# Patient Record
Sex: FEMALE | Race: WHITE | NOT HISPANIC OR LATINO | Employment: OTHER | URBAN - METROPOLITAN AREA
[De-identification: names, ages, dates, MRNs, and addresses within clinical notes are randomized per-mention and may not be internally consistent; named-entity substitution may affect disease eponyms.]

---

## 2019-06-03 ENCOUNTER — HOSPITAL ENCOUNTER (EMERGENCY)
Facility: CLINIC | Age: 44
Discharge: HOME | End: 2019-06-03
Attending: FAMILY MEDICINE | Admitting: FAMILY MEDICINE
Payer: COMMERCIAL

## 2019-06-03 VITALS
DIASTOLIC BLOOD PRESSURE: 78 MMHG | OXYGEN SATURATION: 100 % | WEIGHT: 190 LBS | RESPIRATION RATE: 20 BRPM | TEMPERATURE: 98.5 F | SYSTOLIC BLOOD PRESSURE: 131 MMHG

## 2019-06-03 DIAGNOSIS — R19.7 VOMITING AND DIARRHEA: ICD-10-CM

## 2019-06-03 DIAGNOSIS — R11.10 VOMITING AND DIARRHEA: ICD-10-CM

## 2019-06-03 DIAGNOSIS — E87.6 HYPOKALEMIA: ICD-10-CM

## 2019-06-03 LAB
ALBUMIN SERPL-MCNC: 3.7 G/DL (ref 3.4–5)
ALP SERPL-CCNC: 57 U/L (ref 40–150)
ALT SERPL W P-5'-P-CCNC: 19 U/L (ref 0–50)
ANION GAP SERPL CALCULATED.3IONS-SCNC: 6 MMOL/L (ref 3–14)
AST SERPL W P-5'-P-CCNC: 15 U/L (ref 0–45)
B-HCG SERPL-ACNC: <1 IU/L (ref 0–5)
BASOPHILS # BLD AUTO: 0 10E9/L (ref 0–0.2)
BASOPHILS NFR BLD AUTO: 0.3 %
BILIRUB SERPL-MCNC: 0.5 MG/DL (ref 0.2–1.3)
BUN SERPL-MCNC: 11 MG/DL (ref 7–30)
CALCIUM SERPL-MCNC: 8.2 MG/DL (ref 8.5–10.1)
CHLORIDE SERPL-SCNC: 107 MMOL/L (ref 94–109)
CO2 SERPL-SCNC: 27 MMOL/L (ref 20–32)
CREAT SERPL-MCNC: 0.62 MG/DL (ref 0.52–1.04)
DIFFERENTIAL METHOD BLD: ABNORMAL
EOSINOPHIL NFR BLD AUTO: 0.5 %
ERYTHROCYTE [DISTWIDTH] IN BLOOD BY AUTOMATED COUNT: 12.4 % (ref 10–15)
GFR SERPL CREATININE-BSD FRML MDRD: >90 ML/MIN/{1.73_M2}
GLUCOSE SERPL-MCNC: 93 MG/DL (ref 70–99)
HCT VFR BLD AUTO: 43.7 % (ref 35–47)
HGB BLD-MCNC: 14.4 G/DL (ref 11.7–15.7)
IMM GRANULOCYTES # BLD: 0.1 10E9/L (ref 0–0.4)
IMM GRANULOCYTES NFR BLD: 0.5 %
LIPASE SERPL-CCNC: 161 U/L (ref 73–393)
LYMPHOCYTES # BLD AUTO: 0.6 10E9/L (ref 0.8–5.3)
LYMPHOCYTES NFR BLD AUTO: 5.4 %
MCH RBC QN AUTO: 31.6 PG (ref 26.5–33)
MCHC RBC AUTO-ENTMCNC: 33 G/DL (ref 31.5–36.5)
MCV RBC AUTO: 96 FL (ref 78–100)
MONOCYTES # BLD AUTO: 0.6 10E9/L (ref 0–1.3)
MONOCYTES NFR BLD AUTO: 5.8 %
NEUTROPHILS # BLD AUTO: 9.4 10E9/L (ref 1.6–8.3)
NEUTROPHILS NFR BLD AUTO: 87.5 %
NRBC # BLD AUTO: 0 10*3/UL
NRBC BLD AUTO-RTO: 0 /100
PLATELET # BLD AUTO: 211 10E9/L (ref 150–450)
POTASSIUM SERPL-SCNC: 3 MMOL/L (ref 3.4–5.3)
PROT SERPL-MCNC: 7.2 G/DL (ref 6.8–8.8)
RBC # BLD AUTO: 4.56 10E12/L (ref 3.8–5.2)
SODIUM SERPL-SCNC: 140 MMOL/L (ref 133–144)
WBC # BLD AUTO: 10.8 10E9/L (ref 4–11)

## 2019-06-03 PROCEDURE — 84702 CHORIONIC GONADOTROPIN TEST: CPT | Performed by: FAMILY MEDICINE

## 2019-06-03 PROCEDURE — 85025 COMPLETE CBC W/AUTO DIFF WBC: CPT | Performed by: FAMILY MEDICINE

## 2019-06-03 PROCEDURE — 83690 ASSAY OF LIPASE: CPT | Performed by: FAMILY MEDICINE

## 2019-06-03 PROCEDURE — 96374 THER/PROPH/DIAG INJ IV PUSH: CPT | Performed by: FAMILY MEDICINE

## 2019-06-03 PROCEDURE — 80053 COMPREHEN METABOLIC PANEL: CPT | Performed by: FAMILY MEDICINE

## 2019-06-03 PROCEDURE — 96361 HYDRATE IV INFUSION ADD-ON: CPT | Performed by: FAMILY MEDICINE

## 2019-06-03 PROCEDURE — 25000128 H RX IP 250 OP 636: Performed by: FAMILY MEDICINE

## 2019-06-03 PROCEDURE — 99284 EMERGENCY DEPT VISIT MOD MDM: CPT | Mod: 25 | Performed by: FAMILY MEDICINE

## 2019-06-03 PROCEDURE — 99284 EMERGENCY DEPT VISIT MOD MDM: CPT | Mod: Z6 | Performed by: FAMILY MEDICINE

## 2019-06-03 RX ORDER — POTASSIUM CHLORIDE 1.5 G/1.58G
40 POWDER, FOR SOLUTION ORAL ONCE
Status: DISCONTINUED | OUTPATIENT
Start: 2019-06-03 | End: 2019-06-03

## 2019-06-03 RX ORDER — ONDANSETRON 4 MG/1
4 TABLET, ORALLY DISINTEGRATING ORAL EVERY 6 HOURS PRN
Qty: 12 TABLET | Refills: 0 | Status: SHIPPED | OUTPATIENT
Start: 2019-06-03 | End: 2019-06-08

## 2019-06-03 RX ORDER — SODIUM CHLORIDE 9 MG/ML
1000 INJECTION, SOLUTION INTRAVENOUS CONTINUOUS
Status: DISCONTINUED | OUTPATIENT
Start: 2019-06-03 | End: 2019-06-03 | Stop reason: HOSPADM

## 2019-06-03 RX ORDER — ONDANSETRON 2 MG/ML
4 INJECTION INTRAMUSCULAR; INTRAVENOUS EVERY 30 MIN PRN
Status: DISCONTINUED | OUTPATIENT
Start: 2019-06-03 | End: 2019-06-03 | Stop reason: HOSPADM

## 2019-06-03 RX ADMIN — ONDANSETRON 4 MG: 2 INJECTION INTRAMUSCULAR; INTRAVENOUS at 04:45

## 2019-06-03 RX ADMIN — SODIUM CHLORIDE 1000 ML: 9 INJECTION, SOLUTION INTRAVENOUS at 04:40

## 2019-06-03 NOTE — DISCHARGE INSTRUCTIONS
Encourage fluids and advance diet as tolerated.  Your potassium was a little bit low due to the vomiting and diarrhea.  You can refer to the hypokalemia information sheet for foods rich in potassium.  Zofran ODT as needed for nausea.  Recheck in clinic if persistent problems.  Return to the ED if you develop persistent vomiting, bloody diarrhea, increasing pain or any concerns.  It was a pleasure visiting with both of you this morning.  I am glad you are feeling at least a little bit better and hope you continue to improve.    Thank you for choosing [facility]. We appreciate the opportunity to meet your urgent medical needs. Please let us know if we could have done anything to make your stay more satisfying.    After discharge, please closely monitor for any new or worsening symptoms. Return to the Emergency Department if you develop any acute worsening signs or symptoms.    If you had lab work, cultures or imaging studies done during your stay, the final results may still be pending. We will call you if your plan of care needs to change. However, if you are not improving as expected, please follow up with your primary care provider or clinic.     Start any prescription medications that were prescribed to you and take them as directed.     Please see additional handouts that may be pertinent to your condition.

## 2019-06-03 NOTE — ED TRIAGE NOTES
Pt reports nausea, vomiting and diarrhea on Thursday and was seen in the ED in [place], was given antinausea medication and was feeling better so she checked herself out. Reports no symptoms over the weekend and then about 2am symptoms returned.

## 2019-06-03 NOTE — ED AVS SNAPSHOT
[facility] Emergency Department  [address] [place] [address]  Phone:  [phone]  Fax:  [phone]                                    Delmy Oneal   MRN: [id number]    Department:  [facility] Emergency Department   Date of Visit:  6/3/2019           After Visit Summary Signature Page    I have received my discharge instructions, and my questions have been answered. I have discussed any challenges I see with this plan with the nurse or doctor.    ..........................................................................................................................................  Patient/Patient Representative Signature      ..........................................................................................................................................  Patient Representative Print Name and Relationship to Patient    ..................................................               ................................................  Date                                   Time    ..........................................................................................................................................  Reviewed by Signature/Title    ...................................................              ..............................................  Date                                               Time          22EPIC Rev 08/18

## 2019-06-03 NOTE — ED PROVIDER NOTES
"  History     Chief Complaint   Patient presents with     Nausea, Vomiting, & Diarrhea     HPI  Delmy Oneal is a 44 year old female who presents to the ED tonight with vomiting and diarrhea.  She had some problems 4 days ago when she developed nausea vomiting diarrhea about 2 hours after eating some chicken wings.  She works at a .  She had vomiting and diarrhea all night and finally went into the emergency department at [facility] around midnight or so.  She was given some Zofran in triage and they were very busy and shortly thereafter she felt much better and left without being seen by a doctor.  That was on Thursday night into Friday morning.  Friday she did \"okay\" and by the evening was feeling good and back to eating normally.  Saturday she also did well and had some pizza.  Sunday she was fine until tonight.  She had pizza earlier this evening and awoke at 2:00 this morning on Monday with nausea vomiting and diarrhea x2 each once again.  She denies any blood in the stools.  No fevers but did feel hot and cold.  Has some aching discomfort in the epigastric region left upper quadrant.  No previous abdominal surgeries.    Otherwise in good health.  Takes no regular medications.  4 vaginal deliveries.  Here with her , Oseas.    Allergies:  No Known Allergies    Problem List:    There are no active problems to display for this patient.       Past Medical History:    History reviewed. No pertinent past medical history.    Past Surgical History:    History reviewed. No pertinent surgical history.    Family History:    History reviewed. No pertinent family history.    Social History:  Marital Status:   [2]  Social History     Tobacco Use     Smoking status: Never Smoker     Smokeless tobacco: Never Used   Substance Use Topics     Alcohol use: Yes     Comment: occasional      Drug use: Never        Medications:      ondansetron (ZOFRAN ODT) 4 MG ODT tab         Review of Systems   All " other systems reviewed and are negative.      Physical Exam   BP: 132/71  Heart Rate: 89  Temp: 98.5  F (36.9  C)  Resp: 20  Weight: 86.2 kg (190 lb)  SpO2: 99 %      Physical Exam   Constitutional: She is oriented to person, place, and time. She appears well-developed and well-nourished. No distress.   HENT:   Mouth/Throat: Oropharynx is clear and moist.   Eyes: EOM are normal.   Cardiovascular: Normal rate and regular rhythm.   Pulmonary/Chest: Effort normal and breath sounds normal. No respiratory distress.   Abdominal: Soft. There is tenderness (mild LUQ). There is no rebound and no guarding.   Musculoskeletal: Normal range of motion.   Neurological: She is alert and oriented to person, place, and time. No cranial nerve deficit.   Skin: Skin is warm and dry.   Psychiatric: She has a normal mood and affect.       ED Course  (with Medical Decision Making)    44-year-old female with second episode of vomiting and diarrhea after suffering the same about 4 days ago.  No blood in the stools.  Felt better after some Zofran in triage and then was doing fine until waking up at 2:00 again this morning.    IV was placed.  Labs drawn.  1 L normal saline while waiting for labs to come back.  Zofran for nausea.      Her labs were reassuring.  Potassium a little bit low at 3.0.  I was going to give her 40 mEq of K-Maia but she would really like to get going as she has  kids coming soon.  She runs a  at home.  She was given a list of high potassium foods that she can refer to.  She was able to tolerate oral liquids.  Zofran ODT for home.  We discussed reportable signs and when to return.  She and her  are comfortable with this plan.    She has persistent problems especially after eating fatty foods, may need to look at her gallbladder.  This possibility was raised earlier this year when she was seen a couple of different times with abdominal pain.  Typically would not cause diarrhea but still need to  consider it.          Procedures               Critical Care time:  none               Results for orders placed or performed during the hospital encounter of 06/03/19 (from the past 24 hour(s))   CBC with platelets differential   Result Value Ref Range    WBC 10.8 4.0 - 11.0 10e9/L    RBC Count 4.56 3.8 - 5.2 10e12/L    Hemoglobin 14.4 11.7 - 15.7 g/dL    Hematocrit 43.7 35.0 - 47.0 %    MCV 96 78 - 100 fl    MCH 31.6 26.5 - 33.0 pg    MCHC 33.0 31.5 - 36.5 g/dL    RDW 12.4 10.0 - 15.0 %    Platelet Count 211 150 - 450 10e9/L    Diff Method Automated Method     % Neutrophils 87.5 %    % Lymphocytes 5.4 %    % Monocytes 5.8 %    % Eosinophils 0.5 %    % Basophils 0.3 %    % Immature Granulocytes 0.5 %    Nucleated RBCs 0 0 /100    Absolute Neutrophil 9.4 (H) 1.6 - 8.3 10e9/L    Absolute Lymphocytes 0.6 (L) 0.8 - 5.3 10e9/L    Absolute Monocytes 0.6 0.0 - 1.3 10e9/L    Absolute Basophils 0.0 0.0 - 0.2 10e9/L    Abs Immature Granulocytes 0.1 0 - 0.4 10e9/L    Absolute Nucleated RBC 0.0    Comprehensive metabolic panel   Result Value Ref Range    Sodium 140 133 - 144 mmol/L    Potassium 3.0 (L) 3.4 - 5.3 mmol/L    Chloride 107 94 - 109 mmol/L    Carbon Dioxide 27 20 - 32 mmol/L    Anion Gap 6 3 - 14 mmol/L    Glucose 93 70 - 99 mg/dL    Urea Nitrogen 11 7 - 30 mg/dL    Creatinine 0.62 0.52 - 1.04 mg/dL    GFR Estimate >90 >60 mL/min/[1.73_m2]    GFR Estimate If Black >90 >60 mL/min/[1.73_m2]    Calcium 8.2 (L) 8.5 - 10.1 mg/dL    Bilirubin Total 0.5 0.2 - 1.3 mg/dL    Albumin 3.7 3.4 - 5.0 g/dL    Protein Total 7.2 6.8 - 8.8 g/dL    Alkaline Phosphatase 57 40 - 150 U/L    ALT 19 0 - 50 U/L    AST 15 0 - 45 U/L   Lipase   Result Value Ref Range    Lipase 161 73 - 393 U/L   HCG quantitative pregnancy   Result Value Ref Range    HCG Quantitative Serum <1 0 - 5 IU/L       Medications   0.9% sodium chloride BOLUS (0 mLs Intravenous Stopped 6/3/19 0526)     Followed by   sodium chloride 0.9% infusion (has no administration  in time range)   ondansetron (ZOFRAN) injection 4 mg (4 mg Intravenous Given 6/3/19 2801)       Assessments & Plan     I have reviewed the nursing notes.    I have reviewed the findings, diagnosis, plan and need for follow up with the patient.          Medication List      Started    ondansetron 4 MG ODT tab  Commonly known as:  ZOFRAN ODT  4 mg, Oral, EVERY 6 HOURS PRN            Final diagnoses:   Vomiting and diarrhea   Hypokalemia - mild, 3.0       6/3/2019   [facility] EMERGENCY DEPARTMENT     Harshil Braun MD  06/03/19 3188

## 2024-04-22 ENCOUNTER — HOSPITAL ENCOUNTER (EMERGENCY)
Facility: CLINIC | Age: 49
Discharge: HOME | End: 2024-04-22
Attending: STUDENT IN AN ORGANIZED HEALTH CARE EDUCATION/TRAINING PROGRAM | Admitting: STUDENT IN AN ORGANIZED HEALTH CARE EDUCATION/TRAINING PROGRAM
Payer: COMMERCIAL

## 2024-04-22 ENCOUNTER — APPOINTMENT (OUTPATIENT)
Dept: GENERAL RADIOLOGY | Facility: CLINIC | Age: 49
End: 2024-04-22
Attending: STUDENT IN AN ORGANIZED HEALTH CARE EDUCATION/TRAINING PROGRAM
Payer: COMMERCIAL

## 2024-04-22 VITALS
SYSTOLIC BLOOD PRESSURE: 113 MMHG | RESPIRATION RATE: 18 BRPM | TEMPERATURE: 98.3 F | HEIGHT: 69 IN | OXYGEN SATURATION: 100 % | HEART RATE: 74 BPM | DIASTOLIC BLOOD PRESSURE: 65 MMHG | WEIGHT: 164 LBS | BODY MASS INDEX: 24.29 KG/M2

## 2024-04-22 DIAGNOSIS — R07.81 RIB PAIN ON LEFT SIDE: ICD-10-CM

## 2024-04-22 PROCEDURE — 250N000013 HC RX MED GY IP 250 OP 250 PS 637: Performed by: STUDENT IN AN ORGANIZED HEALTH CARE EDUCATION/TRAINING PROGRAM

## 2024-04-22 PROCEDURE — 99283 EMERGENCY DEPT VISIT LOW MDM: CPT | Performed by: STUDENT IN AN ORGANIZED HEALTH CARE EDUCATION/TRAINING PROGRAM

## 2024-04-22 PROCEDURE — 71101 X-RAY EXAM UNILAT RIBS/CHEST: CPT | Mod: LT

## 2024-04-22 RX ORDER — CYCLOBENZAPRINE HCL 10 MG
5 TABLET ORAL 3 TIMES DAILY PRN
Qty: 11 TABLET | Refills: 0 | Status: SHIPPED | OUTPATIENT
Start: 2024-04-22 | End: 2024-04-29

## 2024-04-22 RX ORDER — CYCLOBENZAPRINE HCL 5 MG
5 TABLET ORAL ONCE
Status: COMPLETED | OUTPATIENT
Start: 2024-04-22 | End: 2024-04-22

## 2024-04-22 RX ADMIN — CYCLOBENZAPRINE HYDROCHLORIDE 5 MG: 5 TABLET, FILM COATED ORAL at 18:29

## 2024-04-22 ASSESSMENT — ACTIVITIES OF DAILY LIVING (ADL)
ADLS_ACUITY_SCORE: 33
ADLS_ACUITY_SCORE: 33

## 2024-04-22 ASSESSMENT — COLUMBIA-SUICIDE SEVERITY RATING SCALE - C-SSRS
2. HAVE YOU ACTUALLY HAD ANY THOUGHTS OF KILLING YOURSELF IN THE PAST MONTH?: NO
6. HAVE YOU EVER DONE ANYTHING, STARTED TO DO ANYTHING, OR PREPARED TO DO ANYTHING TO END YOUR LIFE?: NO
1. IN THE PAST MONTH, HAVE YOU WISHED YOU WERE DEAD OR WISHED YOU COULD GO TO SLEEP AND NOT WAKE UP?: NO

## 2024-04-22 NOTE — ED TRIAGE NOTES
"Reports falling down carpeted stairs at 1300 and heard a \"crack\" to upper left back area.         "

## 2024-04-22 NOTE — DISCHARGE INSTRUCTIONS
Please continue ibuprofen and Tylenol this will help your pain control.  A short course of Flexeril sent to your pharmacy to help with any muscle spasm he may be suffering from.  This can make you sleepy therefore do trial this and do not use any heavy equipment when using.  Symptoms do not improve or get worse or have any shortness of breath please return for reevaluation.

## 2024-04-22 NOTE — ED PROVIDER NOTES
"  History     Chief Complaint   Patient presents with    Back Pain     HPI  Alexsandra Oneal is a 49 year old female otherwise healthy presents for fall onto left ribs.  She notes that going on stairs and she is on a blanket she fell straight backwards onto her left posterior side with some pain and heard a crack and was concerned she progressively loss of breath and noted that the pain is severe at the moment and has decreased and stented.  This happened at 1:00.  Her vitals are stable.  Denies hitting her head no blood thinning medications and no known sick of her extremities.    Allergies:  No Known Allergies    Problem List:    There are no problems to display for this patient.       Past Medical History:    No past medical history on file.    Past Surgical History:    No past surgical history on file.    Family History:    No family history on file.    Social History:  Marital Status:   [2]  Social History     Tobacco Use    Smoking status: Never    Smokeless tobacco: Never   Substance Use Topics    Alcohol use: Yes     Comment: occasional     Drug use: Never        Medications:    cyclobenzaprine (FLEXERIL) 10 MG tablet          Review of Systems   Musculoskeletal:         Left rib pain   All other systems reviewed and are negative.      Physical Exam   BP: 113/65  Pulse: 74  Temp: 98.3  F (36.8  C)  Resp: 18  Height: 175.3 cm (5' 9\")  Weight: 74.4 kg (164 lb)  SpO2: 100 %      Physical Exam  Vitals and nursing note reviewed.   Constitutional:       General: She is not in acute distress.     Appearance: Normal appearance. She is normal weight. She is not ill-appearing or toxic-appearing.   HENT:      Head: Normocephalic and atraumatic.   Cardiovascular:      Rate and Rhythm: Normal rate.      Pulses: Normal pulses.      Heart sounds: Normal heart sounds.   Pulmonary:      Effort: Pulmonary effort is normal. No respiratory distress.      Breath sounds: Normal breath sounds. No wheezing or rales.   Chest: "      Chest wall: Tenderness (left posterior chest) present.   Neurological:      Mental Status: She is alert.         ED Course        Procedures             Results for orders placed or performed during the hospital encounter of 04/22/24 (from the past 24 hour(s))   Ribs XR, unilat 3 views + PA chest,  left    Narrative    EXAM: XR RIBS AND CHEST LEFT 3 VIEWS  LOCATION: [facility]  DATE: 4/22/2024    INDICATION: Fall. [place] crack and posterior 7 10 left.  COMPARISON: None.      Impression    IMPRESSION: There is no radiographic evidence of an acute displaced left lower rib fracture with attention to the skin markers.     No evidence of pneumonia or pulmonary edema. Cardiomediastinal silhouette and pulmonary vasculature are within normal limits. No pneumothorax or pleural effusion.       Medications   cyclobenzaprine (FLEXERIL) tablet 5 mg (5 mg Oral $Given 4/22/24 1829)       Assessments & Plan (with Medical Decision Making)     I have reviewed the nursing notes.    I have reviewed the findings, diagnosis, plan and need for follow up with the patient.      Medical Decision Making  49-year-old female otherwise healthy presenting with left-sided posterior rib pain after a fall at home.  Tripped down stairs denies hitting her head has not noticed any of her extremities.  No chest pain chills breathing shortness of breath at this.  No abdominal pain.  Examination otherwise benign for any abnormal cardiac or respiratory sounds for any crepitus or pneumothorax.  Trachea is midline.  Not short of breath.  Patient provided with Flexeril.  Chest x-ray ordered and vital stable.  X-rays reviewed with no acute signs of cardiopulmonary pathology.  X-rays read by radiology with no signs of fractures.  Patient notified and discharged with Flexeril medications as needed for pain control/spasms otherwise ibuprofen to offer mainstay management and return precautions discussed.      New Prescriptions     CYCLOBENZAPRINE (FLEXERIL) 10 MG TABLET    Take 0.5 tablets (5 mg) by mouth 3 times daily as needed for muscle spasms       Final diagnoses:   Rib pain on left side       4/22/2024   [facility] EMERGENCY DEPT       Jesus Manuel Moura MD  04/22/24 1283

## 2024-04-23 PROCEDURE — 99283 EMERGENCY DEPT VISIT LOW MDM: CPT | Performed by: STUDENT IN AN ORGANIZED HEALTH CARE EDUCATION/TRAINING PROGRAM
